# Patient Record
Sex: MALE | Race: OTHER | HISPANIC OR LATINO | URBAN - METROPOLITAN AREA
[De-identification: names, ages, dates, MRNs, and addresses within clinical notes are randomized per-mention and may not be internally consistent; named-entity substitution may affect disease eponyms.]

---

## 2020-07-21 ENCOUNTER — EMERGENCY (EMERGENCY)
Facility: HOSPITAL | Age: 40
LOS: 1 days | Discharge: OTHER INSTITUTION | End: 2020-07-21
Admitting: EMERGENCY MEDICINE
Payer: MEDICARE

## 2020-07-21 VITALS
SYSTOLIC BLOOD PRESSURE: 111 MMHG | HEART RATE: 112 BPM | WEIGHT: 175.05 LBS | OXYGEN SATURATION: 99 % | DIASTOLIC BLOOD PRESSURE: 64 MMHG | TEMPERATURE: 99 F | HEIGHT: 66 IN | RESPIRATION RATE: 18 BRPM

## 2020-07-21 VITALS
TEMPERATURE: 98 F | SYSTOLIC BLOOD PRESSURE: 116 MMHG | HEART RATE: 71 BPM | RESPIRATION RATE: 18 BRPM | DIASTOLIC BLOOD PRESSURE: 78 MMHG | OXYGEN SATURATION: 100 %

## 2020-07-21 DIAGNOSIS — F48.9 NONPSYCHOTIC MENTAL DISORDER, UNSPECIFIED: ICD-10-CM

## 2020-07-21 DIAGNOSIS — F20.9 SCHIZOPHRENIA, UNSPECIFIED: ICD-10-CM

## 2020-07-21 DIAGNOSIS — F19.20 OTHER PSYCHOACTIVE SUBSTANCE DEPENDENCE, UNCOMPLICATED: ICD-10-CM

## 2020-07-21 LAB
ALBUMIN SERPL ELPH-MCNC: 4.7 G/DL — SIGNIFICANT CHANGE UP (ref 3.3–5)
ALP SERPL-CCNC: 80 U/L — SIGNIFICANT CHANGE UP (ref 40–120)
ALT FLD-CCNC: 13 U/L — SIGNIFICANT CHANGE UP (ref 4–41)
AMPHET UR-MCNC: NEGATIVE — SIGNIFICANT CHANGE UP
ANION GAP SERPL CALC-SCNC: 8 MMO/L — SIGNIFICANT CHANGE UP (ref 7–14)
APAP SERPL-MCNC: < 15 UG/ML — LOW (ref 15–25)
APPEARANCE UR: CLEAR — SIGNIFICANT CHANGE UP
AST SERPL-CCNC: 12 U/L — SIGNIFICANT CHANGE UP (ref 4–40)
BARBITURATES UR SCN-MCNC: NEGATIVE — SIGNIFICANT CHANGE UP
BASOPHILS # BLD AUTO: 0.06 K/UL — SIGNIFICANT CHANGE UP (ref 0–0.2)
BASOPHILS NFR BLD AUTO: 0.4 % — SIGNIFICANT CHANGE UP (ref 0–2)
BENZODIAZ UR-MCNC: NEGATIVE — SIGNIFICANT CHANGE UP
BILIRUB SERPL-MCNC: 0.3 MG/DL — SIGNIFICANT CHANGE UP (ref 0.2–1.2)
BILIRUB UR-MCNC: NEGATIVE — SIGNIFICANT CHANGE UP
BLOOD UR QL VISUAL: NEGATIVE — SIGNIFICANT CHANGE UP
BUN SERPL-MCNC: 16 MG/DL — SIGNIFICANT CHANGE UP (ref 7–23)
CALCIUM SERPL-MCNC: 10 MG/DL — SIGNIFICANT CHANGE UP (ref 8.4–10.5)
CANNABINOIDS UR-MCNC: POSITIVE — SIGNIFICANT CHANGE UP
CHLORIDE SERPL-SCNC: 111 MMOL/L — HIGH (ref 98–107)
CO2 SERPL-SCNC: 21 MMOL/L — LOW (ref 22–31)
COCAINE METAB.OTHER UR-MCNC: POSITIVE — SIGNIFICANT CHANGE UP
COLOR SPEC: YELLOW — SIGNIFICANT CHANGE UP
CREAT SERPL-MCNC: 1.06 MG/DL — SIGNIFICANT CHANGE UP (ref 0.5–1.3)
EOSINOPHIL # BLD AUTO: 0.2 K/UL — SIGNIFICANT CHANGE UP (ref 0–0.5)
EOSINOPHIL NFR BLD AUTO: 1.5 % — SIGNIFICANT CHANGE UP (ref 0–6)
ETHANOL BLD-MCNC: < 10 MG/DL — SIGNIFICANT CHANGE UP
GLUCOSE SERPL-MCNC: 105 MG/DL — HIGH (ref 70–99)
GLUCOSE UR-MCNC: NEGATIVE — SIGNIFICANT CHANGE UP
HCT VFR BLD CALC: 44.1 % — SIGNIFICANT CHANGE UP (ref 39–50)
HGB BLD-MCNC: 14.5 G/DL — SIGNIFICANT CHANGE UP (ref 13–17)
IMM GRANULOCYTES NFR BLD AUTO: 1 % — SIGNIFICANT CHANGE UP (ref 0–1.5)
KETONES UR-MCNC: NEGATIVE — SIGNIFICANT CHANGE UP
LEUKOCYTE ESTERASE UR-ACNC: NEGATIVE — SIGNIFICANT CHANGE UP
LYMPHOCYTES # BLD AUTO: 1.82 K/UL — SIGNIFICANT CHANGE UP (ref 1–3.3)
LYMPHOCYTES # BLD AUTO: 13.4 % — SIGNIFICANT CHANGE UP (ref 13–44)
MCHC RBC-ENTMCNC: 28 PG — SIGNIFICANT CHANGE UP (ref 27–34)
MCHC RBC-ENTMCNC: 32.9 % — SIGNIFICANT CHANGE UP (ref 32–36)
MCV RBC AUTO: 85.3 FL — SIGNIFICANT CHANGE UP (ref 80–100)
METHADONE UR-MCNC: NEGATIVE — SIGNIFICANT CHANGE UP
MONOCYTES # BLD AUTO: 0.77 K/UL — SIGNIFICANT CHANGE UP (ref 0–0.9)
MONOCYTES NFR BLD AUTO: 5.7 % — SIGNIFICANT CHANGE UP (ref 2–14)
NEUTROPHILS # BLD AUTO: 10.57 K/UL — HIGH (ref 1.8–7.4)
NEUTROPHILS NFR BLD AUTO: 78 % — HIGH (ref 43–77)
NITRITE UR-MCNC: NEGATIVE — SIGNIFICANT CHANGE UP
NRBC # FLD: 0 K/UL — SIGNIFICANT CHANGE UP (ref 0–0)
OPIATES UR-MCNC: NEGATIVE — SIGNIFICANT CHANGE UP
OXYCODONE UR-MCNC: NEGATIVE — SIGNIFICANT CHANGE UP
PCP UR-MCNC: NEGATIVE — SIGNIFICANT CHANGE UP
PH UR: 6 — SIGNIFICANT CHANGE UP (ref 5–8)
PLATELET # BLD AUTO: 368 K/UL — SIGNIFICANT CHANGE UP (ref 150–400)
PMV BLD: 8.6 FL — SIGNIFICANT CHANGE UP (ref 7–13)
POTASSIUM SERPL-MCNC: 3.7 MMOL/L — SIGNIFICANT CHANGE UP (ref 3.5–5.3)
POTASSIUM SERPL-SCNC: 3.7 MMOL/L — SIGNIFICANT CHANGE UP (ref 3.5–5.3)
PROT SERPL-MCNC: 7.2 G/DL — SIGNIFICANT CHANGE UP (ref 6–8.3)
PROT UR-MCNC: 10 — SIGNIFICANT CHANGE UP
RBC # BLD: 5.17 M/UL — SIGNIFICANT CHANGE UP (ref 4.2–5.8)
RBC # FLD: 15.9 % — HIGH (ref 10.3–14.5)
SALICYLATES SERPL-MCNC: < 5 MG/DL — LOW (ref 15–30)
SARS-COV-2 RNA SPEC QL NAA+PROBE: SIGNIFICANT CHANGE UP
SODIUM SERPL-SCNC: 140 MMOL/L — SIGNIFICANT CHANGE UP (ref 135–145)
SP GR SPEC: 1.03 — SIGNIFICANT CHANGE UP (ref 1–1.04)
TSH SERPL-MCNC: 0.64 UIU/ML — SIGNIFICANT CHANGE UP (ref 0.27–4.2)
UROBILINOGEN FLD QL: 2 — SIGNIFICANT CHANGE UP
WBC # BLD: 13.55 K/UL — HIGH (ref 3.8–10.5)
WBC # FLD AUTO: 13.55 K/UL — HIGH (ref 3.8–10.5)

## 2020-07-21 PROCEDURE — 93010 ELECTROCARDIOGRAM REPORT: CPT

## 2020-07-21 PROCEDURE — 99285 EMERGENCY DEPT VISIT HI MDM: CPT

## 2020-07-21 PROCEDURE — 99285 EMERGENCY DEPT VISIT HI MDM: CPT | Mod: 25

## 2020-07-21 RX ORDER — ACETAMINOPHEN 500 MG
650 TABLET ORAL ONCE
Refills: 0 | Status: COMPLETED | OUTPATIENT
Start: 2020-07-21 | End: 2020-07-21

## 2020-07-21 RX ORDER — OLANZAPINE 15 MG/1
5 TABLET, FILM COATED ORAL AT BEDTIME
Refills: 0 | Status: DISCONTINUED | OUTPATIENT
Start: 2020-07-21 | End: 2020-07-25

## 2020-07-21 RX ADMIN — Medication 650 MILLIGRAM(S): at 20:27

## 2020-07-21 NOTE — ED BEHAVIORAL HEALTH ASSESSMENT NOTE - CURRENT MEDICATION
Abilify 30mg Daily, Vistaril 50mg PRN - threw out medication 2 weeks ago Abilify 30mg Daily, Vistaril 50mg PRN - threw out medication 2 weeks ago per father

## 2020-07-21 NOTE — ED BEHAVIORAL HEALTH ASSESSMENT NOTE - SUMMARY
Patient is a 40 year old single unemployed non caregiver male currently residing in a private residence with his parents. PPH Schizophrenia and polysubstance abuse. Hx of multiple past inpatient admissions last 4 weeks at [facility]. No history of suicide attempts. No history of aggression/violence. + history of legal issues- past arrests for marijuana possession. + history of substance use- daily marijuana use, daily cocaine use, alcohol use (1 beer per day) and history of opioid dependence - detox a few weeks ago at [facility]. He denies history of withdrawal seizures or DTs. No PMH. BIBA referred by self for suicidal ideation.    Patient presents to the ER in the context of suicidal ideation. Patient endorses acute onset of suicidal ideation to starve himself which appears to be due to psychotic delusions in the context of substance dependence and medication non compliance. Patient presents at imminent risk to self and requires inpatient admission for safety and stabilization. Patient is a 40 year old single unemployed non caregiver male currently residing in a private residence with his parents. PPH Schizophrenia and polysubstance abuse. Hx of multiple past inpatient admissions last 4 weeks ago at [facility] x 2 weeks. No history of suicide attempts. No history of aggression/violence. + history of legal issues- past arrests for marijuana possession. + history of substance use- daily marijuana use, daily cocaine use, alcohol use (1 beer per day) and history of opioid dependence - detox a few months ago at [facility]. He denies history of withdrawal symptoms, seizures or DTs. PMH- history of kidney stones. BIBA referred by self for suicidal ideation.    Patient presents to the ER in the context of suicidal ideation. Patient endorses acute onset of suicidal ideation to starve himself which appears to be due to psychotic delusions in the context of substance dependence and medication non compliance. Patient presents at imminent risk to self and requires inpatient admission for safety and stabilization.

## 2020-07-21 NOTE — ED ADULT NURSE REASSESSMENT NOTE - NS ED NURSE REASSESS COMMENT FT1
Pt left  ED at 2150 with [facility] to be transferred to [facility] for inpatient admission. Report given to RN at [place]. Pt alert and oriented at departure. No distress noted.

## 2020-07-21 NOTE — ED BEHAVIORAL HEALTH ASSESSMENT NOTE - ACTIVATING EVENTS/STRESSORS
Substance intoxication or withdrawal/Triggering events leading to humiliation, shame, and/or despair (e.g. Loss of relationship, financial or health status) (real or anticipated)/Non-compliant or not receiving treatment/Recent inpatient discharge

## 2020-07-21 NOTE — ED BEHAVIORAL HEALTH ASSESSMENT NOTE - DESCRIPTION
During course of ED visit patient was calm and cooperative. Patient was not aggressive or violent and did not require PRN medications.    Vital Signs Last 24 Hrs  T(C): 37 (21 Jul 2020 13:17), Max: 37 (21 Jul 2020 13:17)  T(F): 98.6 (21 Jul 2020 13:17), Max: 98.6 (21 Jul 2020 13:17)  HR: 112 (21 Jul 2020 13:17) (112 - 112)  BP: 111/64 (21 Jul 2020 13:17) (111/64 - 111/64)  BP(mean): --  RR: 18 (21 Jul 2020 13:17) (18 - 18)  SpO2: 99% (21 Jul 2020 13:17) (99% - 99%) none see hpi history of kidney stones

## 2020-07-21 NOTE — ED BEHAVIORAL HEALTH ASSESSMENT NOTE - HPI (INCLUDE ILLNESS QUALITY, SEVERITY, DURATION, TIMING, CONTEXT, MODIFYING FACTORS, ASSOCIATED SIGNS AND SYMPTOMS)
Patient is a 40 year old single unemployed non caregiver male currently residing in a private residence with his parents. PPH Schizophrenia and polysubstance abuse. Hx of multiple past inpatient admissions last 4 weeks at [facility]. No history of suicide attempts. No history of aggression/violence. + history of legal issues- past arrests for marijuana possession. + history of substance use- daily marijuana use, daily cocaine use, alcohol use (1 beer per day) and history of opioid dependence - detox a few weeks ago at [facility]. He denies history of withdrawal seizures or DTs. No PMH. BIBA referred by self for suicidal ideation.    Patient reports he came to the ER because he felt suicidal. He reports over the last few days he has been having suicidal ideation to not or drink in an attempt to kill himself. He reports this is due to a conversation he had with a relative in the past and he is reminded on by living with them. He stated he lives with his relatives but would not specify who. He stated a few years ago he was speaking to his relative who said he would call him back and then patient went to a diner and bought weed to help his depression. He reports distress from this interaction and now feels suicidal. Patient was noted to be disorganized and thought blocked. He stated has been feeling depressed, low motivation, endorsed anhedonia, hopelessness, worthlessness, guilt and low energy. He stated he cannot deal with the manipulation of his relatives stating they keep telling them they don't trust him due to his drug history and that he is unemployed. He stated he wants to hurt himself.    Patient denies any hallucinations, denies thought insertion/withdrawal & denies referential thought processes. Patient does not report nor exhibit any signs of rosaline, including irritable or elevated mood, grandiosity, pressured speech, risk-taking behaviors, increase in productivity or agitation. Patient denies any HI, violent thoughts.     See  note for collateral information.     Called Dr. Wong ([phone])- left message requesting call back.     Reviewed [facility] - Reference #: [id number]   12/02/2019 07/16/2020 curaleaf 80% hybrid (1:1) 2.5mgthc and 2.5 mg cbd/dose vape  1 3 Mynor Morfin MD Rodríguez Curaleaf [facility]     03/25/2020 04/01/2020 lorazepam 1 mg tablet  60 30 Jose Wong MD [facility] #[id number] Patient is a 40 year old single unemployed non caregiver male currently residing in a private residence with his parents. PPH Schizophrenia and polysubstance abuse. Hx of multiple past inpatient admissions last 4 weeks ago at [facility] x 2 weeks. No history of suicide attempts. No history of aggression/violence. + history of legal issues- past arrests for marijuana possession. + history of substance use- daily marijuana use, daily cocaine use, alcohol use (1 beer per day) and history of opioid dependence - detox a few months ago at [facility]. He denies history of withdrawal symptoms, seizures or DTs. PMH- history of kidney stones. BIBA referred by self for suicidal ideation.    Patient reports he came to the ER because he felt suicidal. He reports over the last few days he has been having suicidal ideation to not eat or drink in an attempt to kill himself. He reports this is due to a conversation he had with a relative in the past and he is reminded about by living with them. He stated he lives with his relatives but would not specify who. He stated a few years ago he was speaking to his relative who said he would call him back and then patient went to a diner and bought weed to help his depression. He reports distress from this interaction and now feels suicidal. Patient was noted to be disorganized and thought blocked. He stated he has been feeling depressed, low motivation, endorsed anhedonia, hopelessness, worthlessness, guilt and low energy. He stated he cannot deal with the manipulation of his relatives stating they keep telling them they don't trust him due to his drug history and that he is unemployed. He stated he wants to hurt himself.    Patient denies any hallucinations, denies thought insertion/withdrawal & denies referential thought processes. Patient does not report nor exhibit any signs of rosaline, including irritable or elevated mood, grandiosity, pressured speech, risk-taking behaviors, increase in productivity or agitation. Patient denies any HI, violent thoughts.     See  note for collateral information.     Called Dr. Wong ([phone])- left message requesting call back.     Reviewed [facility] - Reference #: [id number]   12/02/2019 07/16/2020 curaleaf 80% hybrid (1:1) 2.5mgthc and 2.5 mg cbd/dose vape  1 3 [facility]     03/25/2020 04/01/2020 lorazepam 1 mg tablet  60 30 Jose Wong MD [facility] #[id number]

## 2020-07-21 NOTE — ED BEHAVIORAL HEALTH ASSESSMENT NOTE - CASE SUMMARY
Patient is a 40 year old single unemployed non caregiver male currently residing in a private residence with his parents. PPH Schizophrenia and polysubstance abuse. Hx of multiple past inpatient admissions last 4 weeks ago at [facility] x 2 weeks. No history of suicide attempts. No history of aggression/violence. + history of legal issues- past arrests for marijuana possession. + history of substance use- daily marijuana use, daily cocaine use, alcohol use (1 beer per day) and history of opioid dependence - detox a few months ago at [facility]. He denies history of withdrawal symptoms, seizures or DTs. PMH- history of kidney stones. BIBA referred by self for suicidal ideation.    Patient presents to the ER in the context of suicidal ideation. Patient endorses acute onset of suicidal ideation to starve himself which appears to be due to psychotic delusions in the context of substance dependence and medication non compliance. Patient presents at imminent risk to self and requires inpatient admission for safety and stabilization.

## 2020-07-21 NOTE — ED BEHAVIORAL HEALTH ASSESSMENT NOTE - TREATMENT
history of detox in past - most recently [facility] a few weeks ago history of suboxone tx; history of detox in past - most recently [facility] a few weeks ago history of detox in past - most recently [facility] a few months ago history of suboxone tx; history of detox in past - most recently [facility] a few months ago

## 2020-07-21 NOTE — ED PROVIDER NOTE - CARE PLAN
Principal Discharge DX:	Bipolar disorder Principal Discharge DX:	Schizophrenia, unspecified type  Secondary Diagnosis:	Deferred condition on axis II  Secondary Diagnosis:	Polysubstance dependence

## 2020-07-21 NOTE — ED BEHAVIORAL HEALTH NOTE - BEHAVIORAL HEALTH NOTE
Pt results back with COVID negative result. Writer contacted [facility] and spoke with Shantal. Shantal informed of results. Legals and test results faxed to [phone]. Pt accepted for transfer by Dr. Haroldo Luong. No precert required. RN informed of hospital information for EMS transport to be arranged. Pt must arrive before 11pm due to voluntary status. Pt's father, Haylie Ramos, contacted at [phone] and informed.

## 2020-07-21 NOTE — ED BEHAVIORAL HEALTH ASSESSMENT NOTE - DESCRIPTION (FIRST USE, LAST USE, QUANTITY, FREQUENCY, DURATION)
endorses drinking 1 beer per day endorses daily use endorses history of  use- last a few weeks ago endorses history of  use- last a few months ago

## 2020-07-21 NOTE — ED BEHAVIORAL HEALTH ASSESSMENT NOTE - RISK ASSESSMENT
Patient presents at imminent risk to self as evidence by suicidal ideation to starve self with intent/plan, history of inpatient admissions, history of legal issues, non compliance with medication, hopelessness, depression and inability to safety plan outside of the hospital environment. High Acute Suicide Risk Patient presents at imminent risk to self as evidence by suicidal ideation to starve self with intent/plan, history of inpatient admissions, history of legal issues, non compliance with medication, hopelessness, substance abuse, depression and inability to safety plan outside of the hospital environment.

## 2020-07-21 NOTE — ED BEHAVIORAL HEALTH ASSESSMENT NOTE - SUICIDE RISK FACTORS
Unable to engage in safety planning/Recent onset of current/past psychiatric diagnosis/Hopelessness or despair/Anhedonia/Alcohol/Substance abuse disorders/Psychotic disorder current/past/Insomnia/Impulsivity/Current mood episode

## 2020-07-21 NOTE — ED ADULT NURSE NOTE - NSIMPLEMENTINTERV_GEN_ALL_ED
Implemented All Universal Safety Interventions:  [place] to call system. Call bell, personal items and telephone within reach. Instruct patient to call for assistance. Room bathroom lighting operational. Non-slip footwear when patient is off stretcher. Physically safe environment: no spills, clutter or unnecessary equipment. Stretcher in lowest position, wheels locked, appropriate side rails in place.

## 2020-07-21 NOTE — ED BEHAVIORAL HEALTH NOTE - BEHAVIORAL HEALTH NOTE
Currently there are no male beds at [facility]. Worker called [facility] and [facility] to fax information. Worker faxed bh assessment, labs, ekg, facesheet) for review.

## 2020-07-21 NOTE — ED BEHAVIORAL HEALTH ASSESSMENT NOTE - SUICIDE PROTECTIVE FACTORS
Supportive social network of family or friends/Identifies reasons for living/Has future plans/Cultural, spiritual and/or moral attitudes against suicide/Responsibility to family and others Positive therapeutic relationships

## 2020-07-21 NOTE — ED ADULT NURSE NOTE - OBJECTIVE STATEMENT
Pt is a 39yo male brought in by EMS for c/o  SI, anxiety, and depression. Pt verbalize having poor appetite, insomnia, and sexual dysfunction, and his current medications are not helping. Admits to occasional alcohol use and daily use of marijuana. Pt sates he wants to be admitted, and he’ll hurt himself if he goes home. Denies HI, and  auditory and visual hallucinations. Ongoing psych eval in progress.

## 2020-07-21 NOTE — ED PROVIDER NOTE - CLINICAL SUMMARY MEDICAL DECISION MAKING FREE TEXT BOX
This is a 40 yr old M, pmh biopolar disorder and polysubstance abuse with c/o depression and SI starve himself to death. Pt states he feels this way a long time and over time it is getting worst. Currently seeing Dr Wong at [facility] with [employer] and complaint with his medication regiment, ( Abilify 60 mg daily). Pt endorses he was psychiatrically hospitalized in [place] couple of wks ago. He states due to his substance abuse problems he has history of detox hospitalization. He states last alcoholic beverage was yesterday and last week he was took  drugs.  Labs, psych consult - recommendation inpatient treatment.

## 2020-07-21 NOTE — ED ADULT NURSE REASSESSMENT NOTE - NS ED NURSE REASSESS COMMENT FT1
Pt waiting EMS transfer to [facility] for inpatient admission to the legacy unit. Report given to RN at [place]. Pt alert and oriented. No distress noted.

## 2020-07-21 NOTE — ED PROVIDER NOTE - OBJECTIVE STATEMENT
This is a 40 yr old M, pmh biopolar disorder and polysubstance abuse with c/o depression and SI starve himself to death. Pt states he feels this way a long time and over time it is getting worst. Currently seeing Dr Wong at [facility] with [employer] and complaint with his medication regiment, ( Abilify 60 mg daily). Pt endorses he was psychiatrically hospitalized in [place] couple of wks ago. He states due to his substance abuse problems he has history of detox hospitalization. He states last alcoholic beverage was yesterday and last week he was took  drugs. . Denies any headache, palpitation,  n/v/ tremors.  Denies pain, SOB, fever, chills, chest and abdominal discomfort. Denies HI/AH/VH. Denies falling, punching or kicking any objects. Denies recent use of alcohol or illicit drug. No evidence of physical injuries. This is a 40 yr old M, pmh biopolar disorder and polysubstance abuse with c/o depression and SI starve himself to death. Pt states he feels this way a long time and over time it is getting worst. Currently seeing Dr Wong at [facility] with [employer] and complaint with his medication regiment, ( Abilify 60 mg daily). Pt endorses he was psychiatrically hospitalized in [place] couple of wks ago. He states due to his substance abuse problems he has history of detox hospitalization. He states last alcoholic beverage was yesterday and last week he was took  drugs. Denies any headache, palpitation,  n/v/ tremors.  Denies pain, SOB, fever, chills, chest and abdominal discomfort. Denies HI/AH/VH. Denies falling, punching or kicking any objects. Denies recent use of alcohol or illicit drug. No evidence of physical injuries.

## 2020-07-21 NOTE — ED BEHAVIORAL HEALTH ASSESSMENT NOTE - OTHER PAST PSYCHIATRIC HISTORY (INCLUDE DETAILS REGARDING ONSET, COURSE OF ILLNESS, INPATIENT/OUTPATIENT TREATMENT)
PPH Schizophrenia and polysubstance abuse. Hx of multiple past inpatient admissions last 4 weeks at [facility]. No history of suicide attempts. Sees psychiatrist Dr. Wong.
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
## 2020-07-21 NOTE — ED BEHAVIORAL HEALTH ASSESSMENT NOTE - VIOLENCE RISK FACTORS:
Feeling of being under threat and being unable to control threat/Substance abuse/Lack of insight into violence risk/need for treatment/Noncompliance with treatment/Impulsivity

## 2020-07-21 NOTE — ED BEHAVIORAL HEALTH ASSESSMENT NOTE - PSYCHIATRIC ISSUES AND PLAN (INCLUDE STANDING AND PRN MEDICATION)
Start Zyprexa 10mg QHS; Haldol 5mg PO/IM PRN, Ativan 2mg PO/IM PRN, Benadryl 50mg Po/IM PRN Start Zyprexa 5mg QHS; Haldol 5mg PO/IM PRN, Ativan 2mg PO/IM PRN, Benadryl 50mg Po/IM PRN- informed EM Jacob

## 2020-07-21 NOTE — ED BEHAVIORAL HEALTH ASSESSMENT NOTE - DETAILS
suicidal ideation to stop eating/drinking to kill self no bed; awaiting placement father suicidal ideation to starve self Dr. Luong

## 2020-07-22 LAB
SARS-COV-2 IGG SERPL QL IA: NEGATIVE — SIGNIFICANT CHANGE UP
SARS-COV-2 IGM SERPL IA-ACNC: <3.8 AU/ML — SIGNIFICANT CHANGE UP

## 2020-08-01 ENCOUNTER — OUTPATIENT (OUTPATIENT)
Dept: OUTPATIENT SERVICES | Facility: HOSPITAL | Age: 40
LOS: 1 days | End: 2020-08-01
Payer: MEDICARE

## 2020-09-01 PROCEDURE — G9005: CPT

## 2020-09-14 DIAGNOSIS — Z71.89 OTHER SPECIFIED COUNSELING: ICD-10-CM

## 2020-09-18 PROBLEM — F31.9 BIPOLAR DISORDER, UNSPECIFIED: Chronic | Status: ACTIVE | Noted: 2020-07-21

## 2020-09-18 PROBLEM — F19.10 OTHER PSYCHOACTIVE SUBSTANCE ABUSE, UNCOMPLICATED: Chronic | Status: ACTIVE | Noted: 2020-07-21

## 2022-11-05 ENCOUNTER — EMERGENCY (EMERGENCY)
Facility: HOSPITAL | Age: 42
LOS: 1 days | Discharge: HOME | End: 2022-11-05
Attending: EMERGENCY MEDICINE
Payer: MEDICARE

## 2022-11-05 VITALS
OXYGEN SATURATION: 98 % | RESPIRATION RATE: 17 BRPM | WEIGHT: 205.03 LBS | TEMPERATURE: 98 F | SYSTOLIC BLOOD PRESSURE: 127 MMHG | HEART RATE: 77 BPM | DIASTOLIC BLOOD PRESSURE: 79 MMHG

## 2022-11-05 PROCEDURE — 99283 EMERGENCY DEPT VISIT LOW MDM: CPT | Mod: FS

## 2022-11-05 PROCEDURE — 99281 EMR DPT VST MAYX REQ PHY/QHP: CPT

## 2022-11-05 RX ORDER — BUPRENORPHINE AND NALOXONE 2; .5 MG/1; MG/1
1 TABLET SUBLINGUAL ONCE
Refills: 0 | Status: DISCONTINUED | OUTPATIENT
Start: 2022-11-05 | End: 2022-11-05

## 2022-11-05 RX ORDER — BUPRENORPHINE AND NALOXONE 2; .5 MG/1; MG/1
2 TABLET SUBLINGUAL ONCE
Refills: 0 | Status: DISCONTINUED | OUTPATIENT
Start: 2022-11-05 | End: 2022-11-05

## 2022-11-05 RX ADMIN — BUPRENORPHINE AND NALOXONE 2 FILM(S): 2; .5 TABLET SUBLINGUAL at 12:47

## 2022-11-05 NOTE — ED PROVIDER NOTE - PATIENT PORTAL LINK FT
You can access the FollowMyHealth Patient Portal offered by [facility] by registering at the following website: [email or web address]. By joining [employer]’s FollowMyHealth portal, you will also be able to view your health information using other applications (apps) compatible with our system.

## 2022-11-05 NOTE — ED PROVIDER NOTE - OBJECTIVE STATEMENT
41 y/o M requesting suboxone.  Patient is part of a detox program at [place] and cannot get a script b/c his provider is [place] of the [place] x 1 [place]. 41 y/o M requesting Suboxone.  Patient is part of a detox program at [place] and cannot get a script b/c his provider is out of the country x 1 week.

## 2022-11-05 NOTE — ED PROVIDER NOTE - NS ED ATTENDING STATEMENT MOD
This was a shared visit with the DEONTE. I reviewed and verified the documentation and independently performed the documented:

## 2022-11-07 ENCOUNTER — EMERGENCY (EMERGENCY)
Facility: HOSPITAL | Age: 42
LOS: 1 days | Discharge: HOME | End: 2022-11-07
Attending: EMERGENCY MEDICINE
Payer: MEDICARE

## 2022-11-07 VITALS
DIASTOLIC BLOOD PRESSURE: 82 MMHG | HEART RATE: 94 BPM | OXYGEN SATURATION: 97 % | SYSTOLIC BLOOD PRESSURE: 137 MMHG | TEMPERATURE: 99 F | RESPIRATION RATE: 16 BRPM

## 2022-11-07 PROCEDURE — 99282 EMERGENCY DEPT VISIT SF MDM: CPT | Mod: FS

## 2022-11-07 PROCEDURE — 99281 EMR DPT VST MAYX REQ PHY/QHP: CPT

## 2022-11-07 NOTE — ED PROVIDER NOTE - PATIENT PORTAL LINK FT
You can access the FollowMyHealth Patient Portal offered by [facility] by registering at the following website: [email or web address]. By joining [employer]’s FollowMyHealth portal, you will also be able to view your health information using other applications (apps) compatible with our system.

## 2022-11-07 NOTE — ED PROVIDER NOTE - CLINICAL SUMMARY MEDICAL DECISION MAKING FREE TEXT BOX
43 yo male PMHx opoid dependence presents to ED for Suboxone dose. Here 11/5 for same. Prescriber returns to country Wednesday. Patient medically stable for discharge.

## 2022-11-07 NOTE — ED PROVIDER NOTE - OBJECTIVE STATEMENT
43 yo male PMHx opoid dependence presents to ED for Suboxone dose. Patient here 11/5 for same. States prescriber is out of country until Wednesday and was told to come to ED for dosing. No acute complaints.

## 2022-11-07 NOTE — ED ADULT TRIAGE NOTE - CHIEF COMPLAINT QUOTE
Requesting subaxone because his provider is out of the country. Pt. was told to come back for another dose if he needs it.

## 2022-11-07 NOTE — ED PROVIDER NOTE - PHYSICAL EXAMINATION
Gen: Nontoxic, well appearing, in NAD.  Skin: Warm and dry as visualized.  Head: NC/AT.  Eyes: PERRLA. EOMI.  Neck: Supple, FROM. Trachea midline.   Resp: No distress.  Cardio: Well perfused.  Ext: No deformities. MAEx4. FROM.   Neuro: A&Ox3. Appears nonfocal.   Psych: Normal affect and mood.

## 2023-03-18 ENCOUNTER — EMERGENCY (EMERGENCY)
Facility: HOSPITAL | Age: 43
LOS: 1 days | Discharge: HOME | End: 2023-03-18
Attending: EMERGENCY MEDICINE
Payer: MEDICARE

## 2023-03-18 VITALS
OXYGEN SATURATION: 96 % | RESPIRATION RATE: 16 BRPM | DIASTOLIC BLOOD PRESSURE: 98 MMHG | SYSTOLIC BLOOD PRESSURE: 133 MMHG | HEART RATE: 85 BPM | WEIGHT: 225.09 LBS | TEMPERATURE: 98 F

## 2023-03-18 PROCEDURE — 99284 EMERGENCY DEPT VISIT MOD MDM: CPT

## 2023-03-18 PROCEDURE — 99283 EMERGENCY DEPT VISIT LOW MDM: CPT

## 2023-03-18 RX ORDER — IBUPROFEN 200 MG
1 TABLET ORAL
Qty: 15 | Refills: 0
Start: 2023-03-18

## 2023-03-18 RX ORDER — OXYCODONE AND ACETAMINOPHEN 5; 325 MG/1; MG/1
1 TABLET ORAL
Qty: 2 | Refills: 0
Start: 2023-03-18 | End: 2023-03-18

## 2023-03-18 RX ORDER — OXYCODONE AND ACETAMINOPHEN 5; 325 MG/1; MG/1
1 TABLET ORAL
Qty: 4 | Refills: 0
Start: 2023-03-18 | End: 2023-03-19

## 2023-03-18 RX ORDER — OXYCODONE AND ACETAMINOPHEN 5; 325 MG/1; MG/1
1 TABLET ORAL ONCE
Refills: 0 | Status: DISCONTINUED | OUTPATIENT
Start: 2023-03-18 | End: 2023-03-18

## 2023-03-18 RX ADMIN — Medication 300 MILLIGRAM(S): at 08:50

## 2023-03-18 RX ADMIN — OXYCODONE AND ACETAMINOPHEN 1 TABLET(S): 5; 325 TABLET ORAL at 08:49

## 2023-03-18 NOTE — ED PROVIDER NOTE - OBJECTIVE STATEMENT
42 y/o male presents in ER and c.o tooth ache that he has for while and getting worse states he had xrya done and he is been schedule to see endodontist at [facility] on Tuesday 3/21/23. denies any fever or chills or trauma. he has tooth decay. no other compliant or concern. he took 600 MG ibuprofen 2 HRS PTA not helping

## 2023-03-18 NOTE — ED ADULT NURSE NOTE - OBJECTIVE STATEMENT
Pt pw c/o toothache. Has a root canal scheduled for Tuesday but cannot bear the pain. Taking Ibuprofen without relief. Denies any fevers.

## 2023-03-18 NOTE — ED PROVIDER NOTE - CLINICAL SUMMARY MEDICAL DECISION MAKING FREE TEXT BOX
44 y/o male presents in ER and c.o tooth ache that he has for while and getting worse states he had xrya done and he is been schedule to see endodontist at [facility] on Tuesday 3/21/23. denies any fever or chills or trauma. he has tooth decay. no other compliant or concern. he took 600 MG ibuprofen 2 HRS PTA not helping  clindamycin and percocet 5Mg f.u dentist

## 2023-03-18 NOTE — ED PROVIDER NOTE - PATIENT PORTAL LINK FT
You can access the FollowMyHealth Patient Portal offered by [facility] by registering at the following website: [email or web address]. By joining [email or web address]’s FollowMyHealth portal, you will also be able to view your health information using other applications (apps) compatible with our system.

## 2023-03-18 NOTE — ED ADULT TRIAGE NOTE - CHIEF COMPLAINT QUOTE
Patient ambulated into ED c/o toothache. Has a root canal scheduled for Tuesday but cannot bear the pain. Taking Ibuprofen without relief.

## 2023-03-18 NOTE — ED PROVIDER NOTE - ATTENDING APP SHARED VISIT CONTRIBUTION OF CARE
Patient seen with NANCY JAEGER.  Here with pain in tooth 29.  Scheduled for root canal in 72 hours.  Now with mild increase in pain.  no trismus on exam.  No abscess on exam.  Will treat for pain and abx and will follow up with dentist and call today to see if can get evaluated sooner.  Non toxic.  Well appearing. Otherwise baseline. Non toxic.  Well appearing. Discussed signs and symptoms and reasons for return with good teachback. Patient seen with NANCY JAEGER.  Here with pain in tooth 29.  Scheduled for root canal in 72 hours.  Now with mild increase in pain.  no trismus on exam.  No abscess on exam.  Will treat for pain and abx and will follow up with dentist and call today to see if can get evaluated sooner.  Non toxic.  Well appearing. Otherwise baseline. Non toxic.  Well appearing. Discussed signs and symptoms and reasons for return with good teachback..

## 2023-03-18 NOTE — ED PROVIDER NOTE - NSFOLLOWUPINSTRUCTIONS_ED_ALL_ED_FT
take medication as prescribed- percocet for sever pain   call and follow up with dentist as well  as soon as you can   Dental Caries, Adult       Dental caries or cavities are areas of decay in the outer layers (enamel and dentin) of your tooth. When you eat or drink sugary foods and liquids, the natural bacteria in your mouth break down those sugars and produce a lot of acids. The acids destroy the protective layer of your tooth, leading to tooth decay.    It is important to treat your tooth decay as soon as possible. Untreated dental caries can spread decay and may lead to a painful infection. Keeping your mouth clean (good oral hygiene) by brushing regularly with fluoride toothpaste, flossing, and getting regular dental checkups can help reduce the bacteria and prevent dental caries.      What are the causes?    Dental caries are caused by the acid that is produced when bacteria in your mouth break down sugary foods and liquids.      What increases the risk?    This condition is more likely to develop in people who:  •Drink a lot of sugary liquids, including alcoholic drinks, such as champagne.      •Eat a lot of sweets and carbohydrates.      •Drink water that is not treated with fluoride.      •Have poor oral hygiene.      •Have deep grooves in their teeth.      •Take certain medicines that decrease saliva.        What are the signs or symptoms?    Symptoms of dental caries include:  •White, brown, or black spots on the teeth.      •Pain as the decay progresses.      •Swelling or bleeding in the gums.        How is this diagnosed?    This condition may be diagnosed based on:  •Your signs and symptoms.      •Oral exams. This includes probing the hardness of the tooth with an instrument called a dental explorer.      •Dental X-rays to look for dental caries between teeth. This is also used to confirm the diagnosis.      Sometimes special lights, dyes, or probes, which use electrical conductivity or laser reflection, can assist in finding dental caries.      How is this treated?    Treatment for dental caries usually involves a procedure to remove the decay and restore the tooth. Restoring the tooth using a filling can be done in the dentist's office. More complex restorations can be created in a lab.      Follow these instructions at home:     •Practice good oral hygiene. This keeps your mouth and gums healthy.      •Use a fluoride-containing toothpaste to brush your teeth twice a day. Floss once a day.      •If your dental caries have caused an infection, you may be given an antibiotic medicine. Take it as told by your dentist. Do not stop taking the antibiotic even if you start to feel better.    •Keep all follow-up visits as told by your dentist. This is important.  •Follow-up visits include regular cleanings. You will be told how often this needs to be done.          How is this prevented?    To prevent dental caries:  •Brush your teeth every morning and night with fluoride toothpaste.      •Floss your teeth once a day.      •Get regular dental cleanings.      •If told by your dentist, wash your mouth with prescription mouthwash (chlorhexidine) and apply topical fluoride to your teeth.      •Drink water that has fluoride added to it.      •Drink water instead of sugary drinks.      •Eat healthy meals and snacks.      •If prescribed by your dentist, have additional in-office fluoride treatments and sealants placed on your teeth.        Contact a health care provider if:    •You have symptoms of tooth decay.        Summary    •Dental caries or cavities are areas of decay in the outer layers of your tooth. It is important to treat your tooth decay as soon as possible.      •This condition is caused by the acid that is produced when bacteria in your mouth break down sugary foods and liquids.      •To prevent this condition, practice good oral hygiene. This keeps your mouth and gums healthy by brushing and flossing. Use fluoride toothpaste.      •Take an antibiotic to treat an infection, if told by your dentist. Do not stop taking the antibiotic even if your condition gets better.      •Have regular dental cleanings and keep all follow-up visits.      This information is not intended to replace advice given to you by your health care provider. Make sure you discuss any questions you have with your health care provider.

## 2023-03-18 NOTE — ED PROVIDER NOTE - PHYSICAL EXAMINATION
Const: AOX3 nontoxic appearing, no apparent respiratory or physical distress. Stable gait   Eyes: MOHINI. EOMI  Neck: Soft and supple. Full ROM without pain.  Resp: Speaking in full sentences. No evidence of respiratory distress.   Skin: No rashes, abrasions or lacerations.  Neuro: Awake, alert & oriented x 3. Moves all extremities symmetrically.

## 2023-09-12 NOTE — ED PROVIDER NOTE - WET READ LAUNCH FT
From: Lubna Chavez  To: Stacy Riley  Sent: 9/12/2023 8:27 AM CDT  Subject: 780G Upgrade    Since I've had the 780G upgrade, I've been getting several lows. Especially late afternoons and early mornings. Can you take a look at my [facility] and advise on any changes I should make?  User: Trey  Password: [email or web address]    Thank you!  Lubna   There are no Wet Read(s) to document.

## 2025-05-26 ENCOUNTER — EMERGENCY (EMERGENCY)
Facility: HOSPITAL | Age: 45
LOS: 1 days | End: 2025-05-26
Attending: STUDENT IN AN ORGANIZED HEALTH CARE EDUCATION/TRAINING PROGRAM
Payer: MEDICARE

## 2025-05-26 VITALS
TEMPERATURE: 98 F | RESPIRATION RATE: 18 BRPM | OXYGEN SATURATION: 98 % | DIASTOLIC BLOOD PRESSURE: 63 MMHG | SYSTOLIC BLOOD PRESSURE: 112 MMHG | WEIGHT: 175.05 LBS | HEART RATE: 107 BPM

## 2025-05-26 PROCEDURE — 99053 MED SERV 10PM-8AM 24 HR FAC: CPT

## 2025-05-26 PROCEDURE — 99285 EMERGENCY DEPT VISIT HI MDM: CPT

## 2025-05-26 NOTE — ED PROVIDER NOTE - ATTENDING CONTRIBUTION TO CARE
45-year-old male presenting for evaluation of anxiety and depression in the setting of having worsening depression while on Abilify.  Patient states to me that his psychiatry team that follows him has been trying to cut back on his Seroquel dose which he notes he feels like is the main medicine that helps him as his blood some get a full night sleep.  Also notes that he has been on Abilify which she states every time he takes this medicine he feels like he has worsening depressive thoughts, lately he has been having thoughts of suicide though he states he does not have a plan.  He decided to come here today to try and move out of the [facility] area as they are the ones who have been giving him the Abilify.  On further discussion he does note that he mostly wants to leave so that he does not have to take the Abilify, he states that he is his own legal guardian and he should be allowed to make these decisions for himself.  He is agreeable to seeing a psychiatrist urine to see if he can be switched from Abilify to a different SSRI.  He has not been having any fevers or chills, he does not have any chest pain or shortness of breath.  He has not had any leg swelling.  On examination his lungs are clear to auscultation bilaterally, his heart rate is borderline tachycardic though in a regular rhythm, he has no pedal edema, his abdomen is soft nontender nondistended.  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  Will obtain psychiatric clearance labs, EKG, placed for diet with fish restriction, discussed with  area to move him over for ongoing management/care.

## 2025-05-26 NOTE — ED PROVIDER NOTE - PATIENT PORTAL LINK FT
You can access the FollowMyHealth Patient Portal offered by [facility] by registering at the following website: [email or web address]. By joining [employer]’s FollowMyHealth portal, you will also be able to view your health information using other applications (apps) compatible with our system.

## 2025-05-26 NOTE — ED PROVIDER NOTE - PHYSICAL EXAMINATION
Gen: well appearing, no acute distress  Head: normocephalic, atraumatic  EENT: EOMI, moist mucous membranes, no scleral icterus  Lung: no increased work of breathing, clear to auscultation bilaterally, no wheezing, rales, rhonchi, speaking in full sentences  CV: regular rate, regular rhythm, normal s1/s2, 2+ radial pulses bilaterally  Abd: soft, non-tender, non-distended,    MSK: No edema, no visible deformities, full range of motion in all 4 extremities  Neuro: Awake, alert, no focal neurologic deficits  Skin: No obvious rash, no jaundice  Psych: normal affect, normal speech

## 2025-05-26 NOTE — ED PROVIDER NOTE - CLINICAL SUMMARY MEDICAL DECISION MAKING FREE TEXT BOX
46yo M diagnosed with anxiety, depression and schizophrenia presents to the ED from outpatient [facility]. Patient has no medical complaints but would like to see a  for help finding a new residency. Consult placed for Social work in morning

## 2025-05-26 NOTE — ED ADULT TRIAGE NOTE - CHIEF COMPLAINT QUOTE
BIBEMS from [facility]pt [facility] will like to speak to social work to find another placement. On Abilify and feels off. Will like to speak to psychiatric. Denies SI/HI. Hx anxiety and depression

## 2025-05-26 NOTE — ED PROVIDER NOTE - OBJECTIVE STATEMENT
46yo M diagnosed with anxiety, depression and schizophrenia presents to the ED from outpatient [facility]. Patient has no medical complaints but would like to see a  for help finding a new residency. Patient challenges the diagnosis of schizophrenia, states he doesn't want to be on Abilify but does not want to see Psychiatrist today.

## 2025-05-27 VITALS
OXYGEN SATURATION: 97 % | TEMPERATURE: 99 F | SYSTOLIC BLOOD PRESSURE: 114 MMHG | RESPIRATION RATE: 18 BRPM | DIASTOLIC BLOOD PRESSURE: 81 MMHG | HEART RATE: 98 BPM

## 2025-05-27 PROBLEM — F11.20 OPIOID DEPENDENCE, UNCOMPLICATED: Chronic | Status: ACTIVE | Noted: 2022-11-05

## 2025-05-27 LAB
ALBUMIN SERPL ELPH-MCNC: 3.4 G/DL — SIGNIFICANT CHANGE UP (ref 3.3–5.2)
ALP SERPL-CCNC: 72 U/L — SIGNIFICANT CHANGE UP (ref 40–120)
ALT FLD-CCNC: 29 U/L — SIGNIFICANT CHANGE UP
AMORPH CRY # UR COMP ASSIST: PRESENT
AMPHET UR-MCNC: NEGATIVE — SIGNIFICANT CHANGE UP
ANION GAP SERPL CALC-SCNC: 14 MMOL/L — SIGNIFICANT CHANGE UP (ref 5–17)
APAP SERPL-MCNC: <3 UG/ML — LOW (ref 10–26)
APPEARANCE UR: CLEAR — SIGNIFICANT CHANGE UP
AST SERPL-CCNC: 22 U/L — SIGNIFICANT CHANGE UP
BACTERIA # UR AUTO: ABNORMAL /HPF
BARBITURATES UR SCN-MCNC: NEGATIVE — SIGNIFICANT CHANGE UP
BASOPHILS # BLD AUTO: 0.08 K/UL — SIGNIFICANT CHANGE UP (ref 0–0.2)
BASOPHILS NFR BLD AUTO: 1.2 % — SIGNIFICANT CHANGE UP (ref 0–2)
BENZODIAZ UR-MCNC: NEGATIVE — SIGNIFICANT CHANGE UP
BILIRUB SERPL-MCNC: <0.2 MG/DL — LOW (ref 0.4–2)
BILIRUB UR-MCNC: NEGATIVE — SIGNIFICANT CHANGE UP
BUN SERPL-MCNC: 16.3 MG/DL — SIGNIFICANT CHANGE UP (ref 8–20)
CALCIUM SERPL-MCNC: 9.1 MG/DL — SIGNIFICANT CHANGE UP (ref 8.4–10.5)
CHLORIDE SERPL-SCNC: 104 MMOL/L — SIGNIFICANT CHANGE UP (ref 96–108)
CO2 SERPL-SCNC: 21 MMOL/L — LOW (ref 22–29)
COCAINE METAB.OTHER UR-MCNC: POSITIVE
COLOR SPEC: YELLOW — SIGNIFICANT CHANGE UP
CREAT SERPL-MCNC: 0.97 MG/DL — SIGNIFICANT CHANGE UP (ref 0.5–1.3)
DIFF PNL FLD: ABNORMAL
EGFR: 99 ML/MIN/1.73M2 — SIGNIFICANT CHANGE UP
EGFR: 99 ML/MIN/1.73M2 — SIGNIFICANT CHANGE UP
EOSINOPHIL # BLD AUTO: 0.11 K/UL — SIGNIFICANT CHANGE UP (ref 0–0.5)
EOSINOPHIL NFR BLD AUTO: 1.6 % — SIGNIFICANT CHANGE UP (ref 0–6)
ETHANOL SERPL-MCNC: <10 MG/DL — SIGNIFICANT CHANGE UP (ref 0–9)
FENTANYL UR QL SCN: NEGATIVE — SIGNIFICANT CHANGE UP
FLUAV AG NPH QL: SIGNIFICANT CHANGE UP
FLUBV AG NPH QL: SIGNIFICANT CHANGE UP
GLUCOSE SERPL-MCNC: 103 MG/DL — HIGH (ref 70–99)
GLUCOSE UR QL: NEGATIVE MG/DL — SIGNIFICANT CHANGE UP
HCT VFR BLD CALC: 38.3 % — LOW (ref 39–50)
HGB BLD-MCNC: 12.3 G/DL — LOW (ref 13–17)
IMM GRANULOCYTES # BLD AUTO: 0.17 K/UL — HIGH (ref 0–0.07)
IMM GRANULOCYTES NFR BLD AUTO: 2.5 % — HIGH (ref 0–0.9)
KETONES UR QL: ABNORMAL MG/DL
LEUKOCYTE ESTERASE UR-ACNC: NEGATIVE — SIGNIFICANT CHANGE UP
LYMPHOCYTES # BLD AUTO: 1.77 K/UL — SIGNIFICANT CHANGE UP (ref 1–3.3)
LYMPHOCYTES NFR BLD AUTO: 25.6 % — SIGNIFICANT CHANGE UP (ref 13–44)
MCHC RBC-ENTMCNC: 27.2 PG — SIGNIFICANT CHANGE UP (ref 27–34)
MCHC RBC-ENTMCNC: 32.1 G/DL — SIGNIFICANT CHANGE UP (ref 32–36)
MCV RBC AUTO: 84.5 FL — SIGNIFICANT CHANGE UP (ref 80–100)
METHADONE UR-MCNC: NEGATIVE — SIGNIFICANT CHANGE UP
MONOCYTES # BLD AUTO: 0.57 K/UL — SIGNIFICANT CHANGE UP (ref 0–0.9)
MONOCYTES NFR BLD AUTO: 8.2 % — SIGNIFICANT CHANGE UP (ref 2–14)
NEUTROPHILS # BLD AUTO: 4.21 K/UL — SIGNIFICANT CHANGE UP (ref 1.8–7.4)
NEUTROPHILS NFR BLD AUTO: 60.9 % — SIGNIFICANT CHANGE UP (ref 43–77)
NITRITE UR-MCNC: NEGATIVE — SIGNIFICANT CHANGE UP
NRBC # BLD AUTO: 0 K/UL — SIGNIFICANT CHANGE UP (ref 0–0)
NRBC # FLD: 0 K/UL — SIGNIFICANT CHANGE UP (ref 0–0)
NRBC BLD AUTO-RTO: 0 /100 WBCS — SIGNIFICANT CHANGE UP (ref 0–0)
OPIATES UR-MCNC: NEGATIVE — SIGNIFICANT CHANGE UP
PCP SPEC-MCNC: SIGNIFICANT CHANGE UP
PCP UR-MCNC: NEGATIVE — SIGNIFICANT CHANGE UP
PH UR: 5.5 — SIGNIFICANT CHANGE UP (ref 5–8)
PLATELET # BLD AUTO: 516 K/UL — HIGH (ref 150–400)
PMV BLD: 9.2 FL — SIGNIFICANT CHANGE UP (ref 7–13)
POTASSIUM SERPL-MCNC: 3.8 MMOL/L — SIGNIFICANT CHANGE UP (ref 3.5–5.3)
POTASSIUM SERPL-SCNC: 3.8 MMOL/L — SIGNIFICANT CHANGE UP (ref 3.5–5.3)
PROT SERPL-MCNC: 6.4 G/DL — LOW (ref 6.6–8.7)
PROT UR-MCNC: SIGNIFICANT CHANGE UP MG/DL
RBC # BLD: 4.53 M/UL — SIGNIFICANT CHANGE UP (ref 4.2–5.8)
RBC # FLD: 15.6 % — HIGH (ref 10.3–14.5)
RBC CASTS # UR COMP ASSIST: 2 /HPF — SIGNIFICANT CHANGE UP (ref 0–4)
RSV RNA NPH QL NAA+NON-PROBE: SIGNIFICANT CHANGE UP
SALICYLATES SERPL-MCNC: <0.6 MG/DL — LOW (ref 10–20)
SARS-COV-2 RNA SPEC QL NAA+PROBE: SIGNIFICANT CHANGE UP
SODIUM SERPL-SCNC: 138 MMOL/L — SIGNIFICANT CHANGE UP (ref 135–145)
SOURCE RESPIRATORY: SIGNIFICANT CHANGE UP
SP GR SPEC: >1.03 — HIGH (ref 1–1.03)
SQUAMOUS # UR AUTO: 2 /HPF — SIGNIFICANT CHANGE UP (ref 0–5)
THC UR QL: POSITIVE
TSH SERPL-MCNC: 1.73 UIU/ML — SIGNIFICANT CHANGE UP (ref 0.27–4.2)
UROBILINOGEN FLD QL: 1 MG/DL — SIGNIFICANT CHANGE UP (ref 0.2–1)
WBC # BLD: 6.91 K/UL — SIGNIFICANT CHANGE UP (ref 3.8–10.5)
WBC # FLD AUTO: 6.91 K/UL — SIGNIFICANT CHANGE UP (ref 3.8–10.5)
WBC UR QL: 2 /HPF — SIGNIFICANT CHANGE UP (ref 0–5)

## 2025-05-27 PROCEDURE — 80307 DRUG TEST PRSMV CHEM ANLYZR: CPT

## 2025-05-27 PROCEDURE — 93005 ELECTROCARDIOGRAM TRACING: CPT

## 2025-05-27 PROCEDURE — 84443 ASSAY THYROID STIM HORMONE: CPT

## 2025-05-27 PROCEDURE — 99284 EMERGENCY DEPT VISIT MOD MDM: CPT | Mod: 25

## 2025-05-27 PROCEDURE — 93010 ELECTROCARDIOGRAM REPORT: CPT

## 2025-05-27 PROCEDURE — 87637 SARSCOV2&INF A&B&RSV AMP PRB: CPT

## 2025-05-27 PROCEDURE — 90792 PSYCH DIAG EVAL W/MED SRVCS: CPT | Mod: 2W

## 2025-05-27 PROCEDURE — 80053 COMPREHEN METABOLIC PANEL: CPT

## 2025-05-27 PROCEDURE — 81001 URINALYSIS AUTO W/SCOPE: CPT

## 2025-05-27 PROCEDURE — 36415 COLL VENOUS BLD VENIPUNCTURE: CPT

## 2025-05-27 PROCEDURE — 85025 COMPLETE CBC W/AUTO DIFF WBC: CPT

## 2025-05-27 NOTE — ED ADULT NURSE REASSESSMENT NOTE - NS ED NURSE REASSESS COMMENT FT1
Pt care assumed @ this time. Pt resting in bed. VSS. Resp even and unlabored. Awaiting further plan of care at this time. Pt updated and verbalizes understanding.

## 2025-05-27 NOTE — ED ADULT NURSE NOTE - NSFALLUNIVINTERV_ED_ALL_ED
Bed/Stretcher in lowest position, wheels locked, appropriate side rails in place/Call bell, personal items and telephone in reach/Instruct patient to call for assistance before getting out of bed/chair/stretcher/Non-slip footwear applied when patient is off stretcher/[place] to call system/Physically safe environment - no spills, clutter or unnecessary equipment/Purposeful proactive rounding/Room/bathroom lighting operational, light cord in reach

## 2025-05-27 NOTE — ED BEHAVIORAL HEALTH ASSESSMENT NOTE - ADDITIONAL DETAILS ALL
suicidal ideation to starve self pt denies  past/current suicidality. per chart hx, pt has suicidal ideation to starve self i/s/o polysubstance abuse/psychosis

## 2025-05-27 NOTE — ED BEHAVIORAL HEALTH ASSESSMENT NOTE - OTHER PAST PSYCHIATRIC HISTORY (INCLUDE DETAILS REGARDING ONSET, COURSE OF ILLNESS, INPATIENT/OUTPATIENT TREATMENT)
[facility] Schizophrenia and polysubstance abuse. Hx of multiple past inpatient admissions last 4 weeks at [facility]. No history of suicide attempts. Sees psychiatrist Dr. Wong. [facility] Schizophrenia and polysubstance abuse.

## 2025-05-27 NOTE — ED BEHAVIORAL HEALTH NOTE - BEHAVIORAL HEALTH NOTE
SW Note: Notified by telepsych that the pt has been cleared for d/c. SW consult requested to see pt for housing issues. Met with pt, introduced self and role. Pt stated he resides at a community residence on the [place] of [facility] and wanted to talk to SW about getting a ne placement. Pt reports he has been residing at this residence since 2021. He also stated that he has spoken to staff at the residence about his concerns and possible new placement. Pt made aware SW can not find new placement from the ER and that he will need to continue to discuss with his community tx team. Pt in agreement and will take a medicaid taxi home. Called his C.R [phone], spoke with Jany and she is aware pt will be returning today. Confirmed address on chart.  Pt was also + for substances on admission. Pt declined sbirt screening. Accepted information on the  remote sbirt services for community outreach and tx referrals if needed  ED provider and RN aware of D/C

## 2025-05-27 NOTE — ED BEHAVIORAL HEALTH ASSESSMENT NOTE - VIOLENCE RISK FACTORS:
Feeling of being under threat and being unable to control threat/Substance abuse/Impulsivity/Lack of insight into violence risk/need for treatment/Noncompliance with treatment Affective dysregulation

## 2025-05-27 NOTE — ED BEHAVIORAL HEALTH ASSESSMENT NOTE - SUMMARY
Patient is a 40 year old single unemployed non caregiver male currently residing in a private residence with his parents. [facility] Schizophrenia and polysubstance abuse. Hx of multiple past inpatient admissions last 4 weeks ago at [facility] x 2 weeks. No history of suicide attempts. No history of aggression/violence. + history of legal issues- past arrests for marijuana possession. + history of substance use- daily marijuana use, daily cocaine use, alcohol use (1 beer per day) and history of opioid dependence - detox a few months ago at [facility]. He denies history of withdrawal symptoms, seizures or DTs. PMH- history of kidney stones. BIBA referred by self for suicidal ideation.    Patient presents to the ER in the context of suicidal ideation. Patient endorses acute onset of suicidal ideation to starve himself which appears to be due to psychotic delusions in the context of substance dependence and medication non compliance. Patient presents at imminent risk to self and requires inpatient admission for safety and stabilization. Patient is a 45 year old single unemployed non caregiver male currently residing at [facility]. [facility] Schizophrenia and polysubstance abuse. Hx of multiple past inpatient admissions . No history of suicide attempts. Hx of order of protection after domestic dispute + history of legal issues- past arrests for marijuana possession. + history of substance use- marijuana use, alcohol use and history of  cocaine use, opioid dependence. . PMH- history of kidney stones. BIBA referred by self for med adjustment depression and housing referral.     Pt presents to ED primarily concerned with his housing situation, noting "I can't take it anymore." and requests referral to SW to discuss alternative placement options. He disagrees with his diagnosis of schizophrenia and only believes he has depression and anxiety. He believes the Abilify he has been taking for over a year is worsening his depression, but notes- that he feels the same whether he takes the medication or not.  He does not believe his mental health is significantly decompensated at this time. He denies any curretn sI/HI. He reprots good sleep and appetite, which suggests he is not strugglign with neurovegetative symptoms of depression.Pt is alert and oreitned x4. He does not appear to be manic or floridly psychotic. He has also demonstrated the ability to make his needs known and advocate for himself in the community. Pt is future oriented, help seeking and able to engage in safety planning.  As such, pt does not meet criteria for inpatient hospitalization at this time. Pt was provided with psychoeducation about making medication changes in collaboration with his outpatient psychiatrist.  Pts response to the disposition, suggest that he may be seeking hospitalization as a form of respite from his dissatisfaction with his current housing and secodary gain  is to be considered.

## 2025-05-27 NOTE — ED BEHAVIORAL HEALTH ASSESSMENT NOTE - NSSUICPROTFACT_PSY_ALL_CORE
future orientation, help seeking behavior/Identifies reasons for living/Supportive social network of family or friends

## 2025-05-27 NOTE — ED BEHAVIORAL HEALTH ASSESSMENT NOTE - PSYCHIATRIC ISSUES AND PLAN (INCLUDE STANDING AND PRN MEDICATION)
Start Zyprexa 5mg QHS; Haldol 5mg PO/IM PRN, Ativan 2mg PO/IM PRN, Benadryl 50mg Po/IM PRN- informed EM Jacob

## 2025-05-27 NOTE — ED BEHAVIORAL HEALTH ASSESSMENT NOTE - SAFETY PLAN ADDT'L DETAILS
Safety plan discussed with.../Provision of National Suicide Prevention Lifeline 2-994-409-TALK (7214)

## 2025-05-27 NOTE — ED BEHAVIORAL HEALTH ASSESSMENT NOTE - DESCRIPTION
history of kidney stones see hpi During course of ED visit patient was calm and cooperative. Patient was not aggressive or violent and did not require PRN medications.    Vital Signs Last 24 Hrs  T(C): 37 (21 Jul 2020 13:17), Max: 37 (21 Jul 2020 13:17)  T(F): 98.6 (21 Jul 2020 13:17), Max: 98.6 (21 Jul 2020 13:17)  HR: 112 (21 Jul 2020 13:17) (112 - 112)  BP: 111/64 (21 Jul 2020 13:17) (111/64 - 111/64)  BP(mean): --  RR: 18 (21 Jul 2020 13:17) (18 - 18)  SpO2: 99% (21 Jul 2020 13:17) (99% - 99%) Extended Triage:   Vital Signs:  · BP Systolic     112 mm Hg  · BP Diastolic    63 mm Hg  · Heart Rate       107 /min  · Respiration Rate (breaths/min)    18 /min  · Temp (F)  98.2 Degrees F  · Temp (C)  36.8 Degrees C  · Temp site oral  · SpO2 (%)  98 %  · O2 Delivery/Oxygen Delivery Method      room air  · Temp at ED Arrival (C)      36.8 Degrees C

## 2025-05-27 NOTE — ED ADULT NURSE NOTE - OBJECTIVE STATEMENT
Pt alert and oriented, from outpatient [facility] report he does not want to go back there and will like to speak with social work. Pt also report on Abilify but reports it makes him feel off and will like his medication changed. Assessed by MD. Blood work, urine and swab sent. EKG done
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
## 2025-05-27 NOTE — ED BEHAVIORAL HEALTH ASSESSMENT NOTE - NSBHMSEMUSCLE_PSY_A_CORE
We have placed an order today for further testing.  CT Urogram  You will receive a call from [facility] to schedule your test, once it is approved by your insurance company. This can take up to 14 days. If you have not heard anything from the scheduling center please call [phone]. The [facility] hours of operation are: M-F 8am-8pm, SAT 7:30am-4pm.     A follow up appointment is required to discuss final test results.  Please follow up in 4-6 weeks.    Care After a Cystoscopy  Diet  • Avoid caffeine products - coffee, tea, chocolate and soda.  * Drink lots of fluids  Common side effects  • You may experience frequent, burning or pink-tinged urine.  • If blood in urine increases, drinking more fluids and resting will help.  Notify your physician if you:  • Have elevated temperature over 101º F   • Urinate a large amount of blood or clots  • Have difficulty urinating  If you have any questions, call [phone]     Normal muscle tone/strength

## 2025-05-27 NOTE — ED ADULT NURSE REASSESSMENT NOTE - NS ED NURSE REASSESS COMMENT FT1
received pt in gown waned by security.  pt is ao stating he is agitated.  he needs his medication changed.  as per pt saw his psych. 2 weeks ago and she increased abilify to 20mg.   as per pt he also needs seroquel which his provider has not ordered.  pt also doesn't want to go back to his community [place]. received pt in [facility] by security.  pt is ao stating he is agitated.  he needs his medication changed.  as per pt saw his psych. 2 weeks ago and she increased Abilify to 20mg.   as per pt he also needs seroquel which his provider has not ordered.  pt also doesn't want to go back to his community residents [place].  admits to cocaine use.  denies avh.

## 2025-05-27 NOTE — ED BEHAVIORAL HEALTH ASSESSMENT NOTE - RISK ASSESSMENT
Patient presents at imminent risk to self as evidence by suicidal ideation to starve self with intent/plan, history of inpatient admissions, history of legal issues, non compliance with medication, hopelessness, substance abuse, depression and inability to safety plan outside of the hospital environment. acute: depression  chronic: SMI  history of inpatient admissions, history of legal issues, hopelessness, substance abuse, depression  modifiable: medication adjustment   protective: future orientation, help seeking behavior, supportive family
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
## 2025-05-31 DIAGNOSIS — Z88.0 ALLERGY STATUS TO PENICILLIN: ICD-10-CM

## 2025-05-31 DIAGNOSIS — Z87.442 PERSONAL HISTORY OF URINARY CALCULI: ICD-10-CM

## 2025-05-31 DIAGNOSIS — F20.9 SCHIZOPHRENIA, UNSPECIFIED: ICD-10-CM

## 2025-05-31 DIAGNOSIS — F32.A DEPRESSION, UNSPECIFIED: ICD-10-CM
